# Patient Record
Sex: MALE
[De-identification: names, ages, dates, MRNs, and addresses within clinical notes are randomized per-mention and may not be internally consistent; named-entity substitution may affect disease eponyms.]

---

## 2022-05-25 ENCOUNTER — HOSPITAL ENCOUNTER (OUTPATIENT)
Dept: HOSPITAL 63 - NM | Age: 67
End: 2022-05-25
Attending: UROLOGY
Payer: COMMERCIAL

## 2022-05-25 VITALS — WEIGHT: 190 LBS | BODY MASS INDEX: 25.73 KG/M2 | HEIGHT: 72 IN

## 2022-05-25 DIAGNOSIS — D41.01: Primary | ICD-10-CM

## 2022-05-25 PROCEDURE — A9562 TC99M MERTIATIDE: HCPCS

## 2022-05-25 PROCEDURE — 78708 K FLOW/FUNCT IMAGE W/DRUG: CPT

## 2022-05-26 NOTE — RAD
Renal scan with Lasix 5/25/2022



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



TECHNIQUE: After the intravenous administration of 5.2 mCi of Technetium 99m MAG3, imaging of the abd
omen and pelvis was performed using the gamma camera for 40 minutes. 40 mg of Lasix were administered
 intravenously 20 minutes into this study. Time activity curves for both kidneys were obtained.



FINDINGS: The patient's previous imaging studies were performed at the [facility] are unavailable fo
r comparison at this time.



Normal perfusion, uptake and excretion of the radionuclide by the left kidney is seen. Uptake and exc
retion of the radionuclide by the inferior aspect of the right kidney is seen. A photopenic area is s
een involving the superior/mid aspect of the right kidney which gradually accumulates activity during
 the course of the study. This may be in the region of the patient's known renal mass. The split antione
l function is as follows: left kidney 84.7 percent. Right kidney 15.3 percent. The time of Max in the
 left kidney is 3.0 minutes. The time of maximum the right kidney is 37.0 minutes. The time of half M
ax of the left kidney is 12.9 minutes. The time activity curve for the left kidney is within normal l
imits. The time activity curve for the right kidney demonstrates gradual cumulation of radionuclide d
uring the coarse of this study.



IMPRESSION:

1. Marked discrepancy in the split renal function. The left kidney is 84.7 percent. The right kidney 
is 15.3 percent.

2. A large photopenic area is seen involving the superior/mid aspect of the right kidney during the i
nitial portion of the study which may reflect the patient's known solid renal mass.



Electronically signed by: Dyllan Key MD (5/26/2022 8:18 AM) [id number]